# Patient Record
(demographics unavailable — no encounter records)

---

## 2024-12-12 NOTE — PHYSICAL EXAM
[Fisher's Sign] : negative Fisher's sign [Pronator Drift] : negative pronator drift [SLR] : negative straight leg raise [de-identified] : 5 out of 5 motor strength, sensation is intact and symmetrical full range of motion flexion extension and rotation, no palpatory tenderness full range of motion of hips shoulders and elbows, no atrophy, negative straight leg raise, no pathological reflexes, no swelling, normal ambulation, no apparent distress skin is intact, no palpable lymph nodes, no upper or lower extremity instability, alert and oriented x3 and normal mood. Normal finger-to nose test. No upper motor neuron findings. Right knee crepitus. [de-identified] : XR AP Lat Lumbar 12/12/2024 -mild degenerative changes- reviewed with patient.

## 2024-12-12 NOTE — DISCUSSION/SUMMARY
[de-identified] : Mild disc degenerative disease. Getting better with Meloxicam. Discussed all options. She will start Acupuncture. Lumbar HEP. If no better in 2-3 weeks, will obtain MRI. All options discussed including rest, medicine, home exercise, acupuncture, Chiropractic care, Physical Therapy, Pain management, and last resort surgery. All questions were answered, all alternatives discussed and the patient is in complete agreement with the treatment plan which the patient contributed to and discussed with me through the shared decision making process. Follow-up appointment as instructed. Any issues and the patient will call or come in sooner.

## 2024-12-12 NOTE — HISTORY OF PRESENT ILLNESS
[Stable] : stable [de-identified] : 58 year old female presents for evaluation of chronic lower back pain with recent exacerbation since last week.  Denies injury. She saw Dr. Paredes last year for a similar issue and was referred to PT and prescribed NSAIDs.  Pain is localized to the lower back. Denies numbness/tingling. Denies weakness. Bending aggravates the pain. She takes meloxicam and gabapentin prescribed by her PCP which helps.  She tried PT in the summer of 2023 which she states provided temporary relief. She also started PT and acupuncture yesterday. She also had tried daily lumbar HEP but no relief. Denies TUSHAR. PMHx: DM She works as a .  No fever chills sweats nausea vomiting no bowel or bladder dysfunction, no recent weight loss or gain no night pain. This history is in addition to the intake form that I personally reviewed.

## 2024-12-12 NOTE — ADDENDUM
[FreeTextEntry1] : This note was written by Santos Rodriguez on 12/12/2024 acting as scribe for Dr. Shiv Cotter M.D.  I, Shiv Cotter MD, have read and attest that all the information, medical decision making and discharge instructions within are true and accurate.